# Patient Record
Sex: MALE | Race: WHITE | NOT HISPANIC OR LATINO | Employment: FULL TIME | URBAN - METROPOLITAN AREA
[De-identification: names, ages, dates, MRNs, and addresses within clinical notes are randomized per-mention and may not be internally consistent; named-entity substitution may affect disease eponyms.]

---

## 2017-08-28 ENCOUNTER — OFFICE VISIT (OUTPATIENT)
Dept: INTERNAL MEDICINE | Facility: CLINIC | Age: 27
End: 2017-08-28

## 2017-08-28 VITALS
SYSTOLIC BLOOD PRESSURE: 112 MMHG | OXYGEN SATURATION: 98 % | DIASTOLIC BLOOD PRESSURE: 82 MMHG | TEMPERATURE: 98.7 F | HEART RATE: 75 BPM | HEIGHT: 69 IN | BODY MASS INDEX: 32.67 KG/M2 | WEIGHT: 220.6 LBS

## 2017-08-28 DIAGNOSIS — D23.9 DERMAL NEVUS: ICD-10-CM

## 2017-08-28 DIAGNOSIS — Z11.3 SCREEN FOR STD (SEXUALLY TRANSMITTED DISEASE): Primary | ICD-10-CM

## 2017-08-28 DIAGNOSIS — F41.9 ANXIETY: ICD-10-CM

## 2017-08-28 PROCEDURE — 99214 OFFICE O/P EST MOD 30 MIN: CPT | Performed by: FAMILY MEDICINE

## 2017-08-28 NOTE — PROGRESS NOTES
Subjective   George Long is a 27 y.o. male.     Chief Complaint   Patient presents with   • would like to have screening for STD   • having anger issues         History of Present Illness   Patient visit for concern about STD would like testing he has not had history of such transmitted disease or known exposures.  He also would like referral to psychology for dealing with anger issues that he thinks has been long-standing and has not had any treatment to Dr. psychologist when he was teenager but didn't really get much out of it he has trouble controlling his emotions sometimes thinks it's more family related issue he is not suicidal or data consult for others.  He also like to be referred to dermatology for skin lesions per home which he did not seek last year when instructed  The following portions of the patient's history were reviewed and updated as appropriate: allergies, current medications, past family history, past medical history, past social history, past surgical history and problem list.    Review of Systems   Constitutional: Negative.    Respiratory: Negative.    Gastrointestinal: Negative.    Genitourinary: Negative.    Skin: Negative.    Psychiatric/Behavioral: Positive for agitation. Negative for decreased concentration and dysphoric mood.       Objective   Physical Exam   Constitutional: He is oriented to person, place, and time. He appears well-developed and well-nourished.   HENT:   Head: Normocephalic and atraumatic.   Eyes: Conjunctivae are normal. Pupils are equal, round, and reactive to light.   Cardiovascular: Normal rate.    Pulmonary/Chest: Effort normal.   Neurological: He is alert and oriented to person, place, and time.   Skin: Skin is warm and dry.   Left palm nevus with irregular border   Psychiatric: He has a normal mood and affect. His behavior is normal. Judgment and thought content normal.   Nursing note and vitals reviewed.      Assessment/Plan   George was seen today for  would like to have screening for std and having anger issues.    Diagnoses and all orders for this visit:    Screen for STD (sexually transmitted disease)  -     HIV-1/O/2 Ag/Ab w Reflex  -     Hepatitis C antibody  -     RPR  -     Chlamydia Trachomatis, Neisseria Gonorrhoeae, Trichomonas Vaginalis, PETROS  -     HSV 1 & 2 - Specific Antibody, IgG    Dermal nevus  -     Ambulatory Referral to Dermatology    Anxiety  -     Ambulatory Referral to Psychology      Follow-up results of blood work and as needed medication warranted with consultation with psychologist for mood recommend follow-up discussed medication

## 2017-08-29 ENCOUNTER — TELEPHONE (OUTPATIENT)
Dept: INTERNAL MEDICINE | Facility: CLINIC | Age: 27
End: 2017-08-29

## 2017-08-29 NOTE — TELEPHONE ENCOUNTER
"Patient called back asking if he needs to take medication for the exposure to herpes simplex 1. Patient stated he is not prone to cold sores and he \"doesn't want to expose anyone else to it.\" Please advise.  "

## 2017-08-29 NOTE — TELEPHONE ENCOUNTER
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

## 2017-08-31 LAB
C TRACH RRNA SPEC QL NAA+PROBE: NEGATIVE
HCV AB S/CO SERPL IA: <0.1 S/CO RATIO (ref 0–0.9)
HIV 1+2 AB+HIV1 P24 AG SERPL QL IA: NON REACTIVE
HSV1 IGG SER IA-ACNC: 4.32 INDEX (ref 0–0.9)
HSV2 IGG SER IA-ACNC: <0.91 INDEX (ref 0–0.9)
N GONORRHOEA RRNA SPEC QL NAA+PROBE: NEGATIVE
RPR SER QL: NORMAL
T VAGINALIS RRNA SPEC QL NAA+PROBE: NEGATIVE

## 2017-09-18 ENCOUNTER — TELEPHONE (OUTPATIENT)
Dept: INTERNAL MEDICINE | Facility: CLINIC | Age: 27
End: 2017-09-18

## 2017-09-18 NOTE — TELEPHONE ENCOUNTER
Patient called stating that he received his lab results a few weeks ago.  His girlfriend was tested and she tested negative but he is concerned if he has Herpes type 1 and if/how he can spread it to his girlfriend.  He also wanted to know if there is medication that he can take to prevent this from happening. Please advise.

## 2017-09-18 NOTE — TELEPHONE ENCOUNTER
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